# Patient Record
Sex: MALE | Race: WHITE | URBAN - METROPOLITAN AREA
[De-identification: names, ages, dates, MRNs, and addresses within clinical notes are randomized per-mention and may not be internally consistent; named-entity substitution may affect disease eponyms.]

---

## 2022-04-30 ENCOUNTER — TELEPHONE ENCOUNTER (OUTPATIENT)
Dept: URBAN - METROPOLITAN AREA CLINIC 121 | Facility: CLINIC | Age: 42
End: 2022-04-30

## 2022-05-01 ENCOUNTER — TELEPHONE ENCOUNTER (OUTPATIENT)
Dept: URBAN - METROPOLITAN AREA CLINIC 121 | Facility: CLINIC | Age: 42
End: 2022-05-01

## 2023-09-06 ENCOUNTER — DASHBOARD ENCOUNTERS (OUTPATIENT)
Age: 43
End: 2023-09-06

## 2023-09-06 ENCOUNTER — LAB OUTSIDE AN ENCOUNTER (OUTPATIENT)
Dept: URBAN - METROPOLITAN AREA CLINIC 82 | Facility: CLINIC | Age: 43
End: 2023-09-06

## 2023-09-06 ENCOUNTER — OFFICE VISIT (OUTPATIENT)
Dept: URBAN - METROPOLITAN AREA CLINIC 82 | Facility: CLINIC | Age: 43
End: 2023-09-06
Payer: COMMERCIAL

## 2023-09-06 VITALS
DIASTOLIC BLOOD PRESSURE: 84 MMHG | BODY MASS INDEX: 35.39 KG/M2 | HEIGHT: 73 IN | SYSTOLIC BLOOD PRESSURE: 128 MMHG | TEMPERATURE: 98 F | WEIGHT: 267 LBS | HEART RATE: 70 BPM

## 2023-09-06 DIAGNOSIS — K21.9 GASTRO-ESOPHAGEAL REFLUX DISEASE WITHOUT ESOPHAGITIS: ICD-10-CM

## 2023-09-06 DIAGNOSIS — Z80.0 FAMILY HISTORY OF COLON CANCER: ICD-10-CM

## 2023-09-06 DIAGNOSIS — R14.3 FLATULENCE: ICD-10-CM

## 2023-09-06 PROCEDURE — 99243 OFF/OP CNSLTJ NEW/EST LOW 30: CPT | Performed by: INTERNAL MEDICINE

## 2023-09-06 PROCEDURE — 99203 OFFICE O/P NEW LOW 30 MIN: CPT | Performed by: INTERNAL MEDICINE

## 2023-09-06 RX ORDER — PANTOPRAZOLE SODIUM 40 MG/1
1 TABLET TABLET, DELAYED RELEASE ORAL ONCE A DAY
Qty: 30 | Refills: 3 | OUTPATIENT
Start: 2023-09-06

## 2023-09-06 NOTE — HPI-TODAY'S VISIT:
REFERED BY  FOR GERD, NOW TOO MUCH GOING ON 5 YEARS,  BEFORE SMALL, LATELY LOT OF ACID, TWICE A DAY. WHEN BURP FEEL ACID, TUMS ONLY, NO DYSPHAGIA, TUMS HELPS  DOES HAVE NOCTURNAL REFLUX.  BM GOOD, DAILY, NO BLEEDING P/R, NO WT LOSS  GAINED WT RECENTLY   DIVALOPREX FOR BIPOLAR,

## 2023-09-11 ENCOUNTER — OUT OF OFFICE VISIT (OUTPATIENT)
Dept: URBAN - METROPOLITAN AREA SURGERY CENTER 13 | Facility: SURGERY CENTER | Age: 43
End: 2023-09-11
Payer: COMMERCIAL

## 2023-09-11 DIAGNOSIS — K44.9 HIATAL HERNIA: ICD-10-CM

## 2023-09-11 DIAGNOSIS — K31.89 ERYTHEMA OF DUODENUM: ICD-10-CM

## 2023-09-11 DIAGNOSIS — K31.89 OTHER DISEASES OF STOMACH AND DUODENUM: ICD-10-CM

## 2023-09-11 DIAGNOSIS — K29.60 OTHER GASTRITIS WITHOUT BLEEDING: ICD-10-CM

## 2023-09-11 DIAGNOSIS — K20.80 ESOPHAGITIS, LOS ANGELES GRADE A: ICD-10-CM

## 2023-09-11 DIAGNOSIS — B96.81 BACTERIAL INFECTION DUE TO H. PYLORI: ICD-10-CM

## 2023-09-11 DIAGNOSIS — K21.00 ESOPHAGITIS, REFLUX: ICD-10-CM

## 2023-09-11 PROCEDURE — G8907 PT DOC NO EVENTS ON DISCHARG: HCPCS | Performed by: INTERNAL MEDICINE

## 2023-09-11 PROCEDURE — 00731 ANES UPR GI NDSC PX NOS: CPT | Performed by: ANESTHESIOLOGIST ASSISTANT

## 2023-09-11 PROCEDURE — 43239 EGD BIOPSY SINGLE/MULTIPLE: CPT | Performed by: INTERNAL MEDICINE

## 2023-09-11 PROCEDURE — 00731 ANES UPR GI NDSC PX NOS: CPT | Performed by: ANESTHESIOLOGY

## 2023-09-11 RX ORDER — PANTOPRAZOLE SODIUM 40 MG/1
1 TABLET TABLET, DELAYED RELEASE ORAL ONCE A DAY
Qty: 30 | Refills: 3 | Status: ACTIVE | COMMUNITY
Start: 2023-09-06

## 2023-09-14 ENCOUNTER — TELEPHONE ENCOUNTER (OUTPATIENT)
Dept: URBAN - METROPOLITAN AREA CLINIC 82 | Facility: CLINIC | Age: 43
End: 2023-09-14

## 2023-09-14 RX ORDER — AMOXICILLIN 500 MG/1
2 CAPSULES CAPSULE ORAL
Qty: 56 CAPSULE | OUTPATIENT
Start: 2023-09-18 | End: 2023-10-02

## 2023-09-14 RX ORDER — PANTOPRAZOLE SODIUM 40 MG/1
1 TABLET TABLET, DELAYED RELEASE ORAL ONCE A DAY
Qty: 30 | Refills: 3 | Status: ACTIVE | COMMUNITY
Start: 2023-09-06

## 2023-09-14 RX ORDER — PANTOPRAZOLE SODIUM 40 MG/1
1 TABLET TABLET, DELAYED RELEASE ORAL TWICE A DAY
Qty: 28 TABLET | OUTPATIENT
Start: 2023-09-18

## 2023-09-14 RX ORDER — CLARITHROMYCIN 500 MG/1
1 TABLET TABLET, FILM COATED ORAL
Qty: 28 TABLET | Refills: 0 | OUTPATIENT
Start: 2023-09-18 | End: 2023-10-02

## 2023-10-10 ENCOUNTER — TELEPHONE ENCOUNTER (OUTPATIENT)
Dept: URBAN - METROPOLITAN AREA CLINIC 82 | Facility: CLINIC | Age: 43
End: 2023-10-10

## 2023-10-10 RX ORDER — PANTOPRAZOLE SODIUM 40 MG/1
1 TABLET TABLET, DELAYED RELEASE ORAL ONCE A DAY
Qty: 30 | Refills: 1 | OUTPATIENT
Start: 2023-10-10

## 2023-11-18 ENCOUNTER — LAB OUTSIDE AN ENCOUNTER (OUTPATIENT)
Dept: URBAN - METROPOLITAN AREA CLINIC 82 | Facility: CLINIC | Age: 43
End: 2023-11-18

## 2023-12-08 ENCOUNTER — TELEPHONE ENCOUNTER (OUTPATIENT)
Dept: URBAN - METROPOLITAN AREA CLINIC 82 | Facility: CLINIC | Age: 43
End: 2023-12-08

## 2023-12-08 RX ORDER — PANTOPRAZOLE SODIUM 40 MG/1
1 TABLET TABLET, DELAYED RELEASE ORAL ONCE A DAY
Qty: 30 | Refills: 2 | OUTPATIENT
Start: 2023-12-12

## 2023-12-09 LAB — H PYLORI BREATH TEST: NEGATIVE
